# Patient Record
Sex: FEMALE | Race: WHITE | Employment: OTHER | URBAN - METROPOLITAN AREA
[De-identification: names, ages, dates, MRNs, and addresses within clinical notes are randomized per-mention and may not be internally consistent; named-entity substitution may affect disease eponyms.]

---

## 2024-04-13 ENCOUNTER — HOSPITAL ENCOUNTER (OUTPATIENT)
Age: 29
Discharge: HOME | End: 2024-04-13
Payer: COMMERCIAL

## 2024-04-13 VITALS
TEMPERATURE: 99 F | WEIGHT: 156.5 LBS | SYSTOLIC BLOOD PRESSURE: 130 MMHG | HEIGHT: 62.6 IN | DIASTOLIC BLOOD PRESSURE: 76 MMHG | BODY MASS INDEX: 28.08 KG/M2 | HEART RATE: 78 BPM | RESPIRATION RATE: 20 BRPM | OXYGEN SATURATION: 98 %

## 2024-04-13 DIAGNOSIS — M54.6 BACK PAIN OF THORACOLUMBAR REGION: Primary | ICD-10-CM

## 2024-04-13 DIAGNOSIS — M54.50 BACK PAIN OF THORACOLUMBAR REGION: Primary | ICD-10-CM

## 2024-04-13 LAB
B-HCG UR QL: NEGATIVE
BILIRUB UR QL STRIP: NEGATIVE
COLOR UR: YELLOW
GLUCOSE UR STRIP-MCNC: NEGATIVE MG/DL
HGB UR QL STRIP: NEGATIVE
LEUKOCYTE ESTERASE UR QL STRIP: NEGATIVE
NITRITE UR QL STRIP: NEGATIVE
PH UR STRIP: 6.5 [PH]
PROT UR STRIP-MCNC: NEGATIVE MG/DL
SP GR UR STRIP: 1.02
UROBILINOGEN UR STRIP-ACNC: <2 MG/DL

## 2024-04-13 PROCEDURE — 99203 OFFICE O/P NEW LOW 30 MIN: CPT

## 2024-04-13 PROCEDURE — 81025 URINE PREGNANCY TEST: CPT

## 2024-04-13 PROCEDURE — 81002 URINALYSIS NONAUTO W/O SCOPE: CPT

## 2024-04-13 PROCEDURE — 99204 OFFICE O/P NEW MOD 45 MIN: CPT

## 2024-04-13 RX ORDER — IBUPROFEN 600 MG/1
600 TABLET ORAL EVERY 8 HOURS PRN
Qty: 20 TABLET | Refills: 0 | Status: SHIPPED | OUTPATIENT
Start: 2024-04-13 | End: 2024-04-20

## 2024-04-13 RX ORDER — LIDOCAINE 50 MG/G
1 PATCH TOPICAL EVERY 24 HOURS
Qty: 10 EACH | Refills: 0 | Status: SHIPPED | OUTPATIENT
Start: 2024-04-13

## 2024-04-13 RX ORDER — CYCLOBENZAPRINE HCL 10 MG
10 TABLET ORAL NIGHTLY PRN
Qty: 10 TABLET | Refills: 0 | Status: SHIPPED | OUTPATIENT
Start: 2024-04-13 | End: 2024-04-23

## 2024-04-13 NOTE — ED INITIAL ASSESSMENT (HPI)
2 months c/o mid low back pain that is getting worse. Saw md in the past for this-blood work and UA done to check kidney status. IBU taken 5 days ago.

## 2024-04-13 NOTE — ED PROVIDER NOTES
Chief Complaint   Patient presents with    Back Pain       HPI:     Coty May is a 29 year old female without any significant medical history presents to the immediate care for evaluation mid and lower back pain that has been on and off since January.  Patient denies any fever, chills, cough, cold symptoms.  No abdominal pain, nausea, vomiting, diarrhea.,  Weakness, bladder or bowel dysfunction.  No hematuria or dysuria.  Pain worse with laying down at night.  Has tried Advil with some relief.      PFSH    PF[facility] asessment screens reviewed  Nurses notes reviewed    No family history on file.    Social History     Socioeconomic History    Marital status:      Spouse name: Not on file    Number of children: Not on file    Years of education: Not on file    Highest education level: Not on file   Occupational History    Not on file   Tobacco Use    Smoking status: Never     Passive exposure: Never    Smokeless tobacco: Never   Vaping Use    Vaping status: Every Day   Substance and Sexual Activity    Alcohol use: Not on file    Drug use: Not on file    Sexual activity: Not on file   Other Topics Concern    Not on file   Social History Narrative    Not on file     Social Determinants of Health     Financial Resource Strain: Not on file   Food Insecurity: Not on file   Transportation Needs: Not on file   Physical Activity: Not on file   Stress: Not on file   Social Connections: Not on file   Housing Stability: Not on file         ROS:   Positive for stated complaint  All other systems reviewed and negative except as noted above.  Constitutional and Vital Signs Reviewed.      Physical Exam:     Findings:    /76   Pulse 78   Temp 98.6 °F (37 °C)   Resp 20   Ht 159 cm (5' 2.6\")   Wt 71 kg   LMP 03/29/2024   SpO2 98%   BMI 28.08 kg/m²   GENERAL: alert, normal appearance, no distress.                 HEAD: Normocephalic, atraumatic.     EYES: Conjunctiva without injection, EOMs intact.                         EARS: External ears normal.                              NECK: supple.             CARDIO: Regular rate and rhythm              LUNGS: No respiratory distress, nonlabored respirations.       Clear to auscultation bilaterally without wheezes, rhonchi, or rales      MSK: Normal rom.  Generalized tenderness to palpation throughout mid and lower back both laterally and midline.  Palpable muscle spasms right mid and lower back with tenderness to palpation     NEURO: Alert and oriented.       MDM/Assessment/Plan:   Orders for this encounter:    Orders Placed This Encounter    POCT Urinalysis Dipstick    POCT Pregnancy, Urine    POCT Urine Dip    POCT Pregnancy, Urine    cyclobenzaprine 10 MG Oral Tab     Sig: Take 1 tablet (10 mg total) by mouth nightly as needed for Muscle spasms.     Dispense:  10 tablet     Refill:  0    ibuprofen 600 MG Oral Tab     Sig: Take 1 tablet (600 mg total) by mouth every 8 (eight) hours as needed for Pain or Fever.     Dispense:  20 tablet     Refill:  0    lidocaine 5 % External Patch     Sig: Place 1 patch onto the skin daily. Take off after 12 hours.  Only use 1 patch per day     Dispense:  10 each     Refill:  0       Labs performed this visit:  Recent Results (from the past 10 hour(s))   POCT Urinalysis Dipstick    Collection Time: 04/13/24 11:27 AM   Result Value Ref Range    Urine Color Yellow Yellow    Urine Clarity Slightly cloudy (A) Clear    Specific Gravity, Urine 1.025 1.005 - 1.030    PH, Urine 6.5 5.0 - 8.0    Protein urine Negative Negative mg/dL    Glucose, Urine Negative Negative mg/dL    Ketone, Urine Trace (A) Negative mg/dL    Bilirubin, Urine Negative Negative    Blood, Urine Negative Negative    Nitrite Urine Negative Negative    Urobilinogen urine <2.0 <2.0 mg/dL    Leukocyte esterase urine Negative Negative   POCT Pregnancy, Urine    Collection Time: 04/13/24 11:28 AM   Result Value Ref Range    POCT Urine Pregnancy Negative Negative       MDM:  Patient overall  well-appearing today with mid and low back pain on and off since January.  Palpable muscle spasm and tenderness to palpation on exam.  Urinalysis negative for any evidence of infection.  Give trial of treatment for musculoskeletal pain with Flexeril, ibuprofen, Lidoderm patches and have patient follow-up with primary care next week for reevaluation.  Present to ER for new or worsening symptoms.  Patient agreeable to treatment plan and follow-up, all questions answered prior to discharge    Diagnosis:    ICD-10-CM    1. Back pain of thoracolumbar region  M54.50     M54.6           All results reviewed and discussed with patient.  See AVS for detailed discharge instructions for your condition today.    Follow Up with:  PCP  Early next week and present to ER for new or worsening symptoms                  NOTE TO PATIENT:  The 21st Century Cures Act makes medical notes like these available to patients in the interest of transparency. However, be advised this is a medical document. It is intended as peer to peer communication. It is written in medical language and may contain abbreviations or verbiage that are unfamiliar. It may appear blunt or direct. Medical documents are intended to carry relevant information, facts as evident, and the clinical opinion of the practitioner.

## 2025-01-20 ENCOUNTER — HOSPITAL ENCOUNTER (EMERGENCY)
Facility: HOSPITAL | Age: 30
Discharge: HOME | End: 2025-01-20
Attending: EMERGENCY MEDICINE
Payer: COMMERCIAL

## 2025-01-20 VITALS
OXYGEN SATURATION: 97 % | WEIGHT: 167.56 LBS | TEMPERATURE: 99 F | DIASTOLIC BLOOD PRESSURE: 79 MMHG | RESPIRATION RATE: 18 BRPM | SYSTOLIC BLOOD PRESSURE: 126 MMHG | HEART RATE: 97 BPM | BODY MASS INDEX: 30 KG/M2

## 2025-01-20 DIAGNOSIS — J11.1 INFLUENZA: ICD-10-CM

## 2025-01-20 DIAGNOSIS — Z3A.14 14 WEEKS GESTATION OF PREGNANCY (HCC): Primary | ICD-10-CM

## 2025-01-20 LAB
FLUAV + FLUBV RNA SPEC NAA+PROBE: NEGATIVE
FLUAV + FLUBV RNA SPEC NAA+PROBE: POSITIVE
RSV RNA SPEC NAA+PROBE: NEGATIVE
SARS-COV-2 RNA RESP QL NAA+PROBE: NOT DETECTED

## 2025-01-20 PROCEDURE — 99283 EMERGENCY DEPT VISIT LOW MDM: CPT

## 2025-01-20 PROCEDURE — 0241U SARS-COV-2/FLU A AND B/RSV BY PCR (GENEXPERT): CPT | Performed by: EMERGENCY MEDICINE

## 2025-01-20 RX ORDER — OSELTAMIVIR PHOSPHATE 75 MG/1
75 CAPSULE ORAL 2 TIMES DAILY
Qty: 10 CAPSULE | Refills: 0 | Status: SHIPPED | OUTPATIENT
Start: 2025-01-20 | End: 2025-01-25

## 2025-01-20 NOTE — ED INITIAL ASSESSMENT (HPI)
Pt presents to ED with cough, low grade fever, runny nose from home. Pt states that she is 14 weeks pregnant. Denies N/V/D. Denies abdominal pain or cramping with bleeding.

## 2025-01-20 NOTE — ED PROVIDER NOTES
Patient Seen in: [facility] Emergency Department      History     Chief Complaint   Patient presents with    Cough/URI     15 weeks pregnant      Stated Complaint:     Subjective:   HPI      Patient is a 29-year-old woman,  14 weeks pregnant who presents with cough, congestion.  Patient's  was sick all night.  She has developed a cough and congestion as well.  No flu shot this year.  No bleeding.  Mild lower abdominal cramping.  No dysuria frequency.  No other specific complaints.  Patient is otherwise at her medical baseline.    Objective:     History reviewed. No pertinent past medical history.           History reviewed. No pertinent surgical history.             Social History     Socioeconomic History    Marital status:    Tobacco Use    Smoking status: Never     Passive exposure: Never    Smokeless tobacco: Never   Vaping Use    Vaping status: Every Day                  Physical Exam     ED Triage Vitals [25 0925]   /79   Pulse 97   Resp 18   Temp 99.1 °F (37.3 °C)   Temp src Temporal   SpO2 97 %   O2 Device None (Room air)       Current Vitals:   Vital Signs  BP: 126/79  Pulse: 97  Resp: 18  Temp: 99.1 °F (37.3 °C)  Temp src: Temporal  Fetal Heart Tones: 154    Oxygen Therapy  SpO2: 97 %  O2 Device: None (Room air)        Physical Exam  General: Patient is resting comfortably with occasional coughing  HEENT: Normal cephalic atraumatic.  Nonicteric sclera.  Moist mucous membranes.  No meningismus.  No adenopathy  Lungs: No tachypnea.  Lungs clear to auscultation bilaterally without rales/rhonchi.  Equal breath sounds bilaterally  Cardiac: No tachycardia.  No murmurs.  Regular rate and rhythm.  Abdomen: Soft and nontender throughout.  No rebound or guarding  Extremities: No clubbing/cyanosis/edema.  Skin: No rashes, no pallor  Neuro: Awake oriented ×3.  Nonfocal.  Good strength throughout    ED Course     Labs Reviewed   SARS-COV-2/FLU A AND B/RSV BY PCR (GENEXPERT) -  Abnormal; Notable for the following components:       Result Value    Influenza A by PCR Positive (*)     All other components within normal limits    Narrative:     This test is intended for the qualitative detection and differentiation of SARS-CoV-2, influenza A, influenza B, and respiratory syncytial virus (RSV) viral RNA in nasopharyngeal or nares swabs from individuals suspected of respiratory viral infection consistent with COVID-19 by their healthcare provider. Signs and symptoms of respiratory viral infection due to SARS-CoV-2, influenza, and RSV can be similar.    Test performed using the Xpert Xpress SARS-CoV-2/FLU/RSV (real time RT-PCR)  assay on the GeneXpert instrument, [employer], [place] [address].   This test is being used under the Food and Drug Administration's Emergency Use Authorization.    The authorized Fact Sheet for Healthcare Providers for this assay is available upon request from the laboratory.            Positive for influenza A  Fetal heart tones: See nurses notes 154       MDM      Patient presents with viral URI symptoms in pregnancy.  Differential clued virus, pneumonia, other.  Will check GEN expert for COVID and flu.  Offered Tylenol, patient declined.  Will check fetal heart tones.  Supportive care  Suggested Tamiflu.  Prescription was sent.  Follow-up with her OB/GYN.  Return if worsening symptoms or new complaints.      Medical Decision Making      Disposition and Plan     Clinical Impression:  1. 14 weeks gestation of pregnancy (HCC)    2. Influenza         Disposition:  Discharge  1/20/2025 10:25 am    Follow-up:  [address] [place] [id number]  [phone]    Follow up in 1 week(s)      your ob    Follow up in 1 week(s)            Medications Prescribed:  Discharge Medication List as of 1/20/2025 10:29 AM        START taking these medications    Details   oseltamivir (TAMIFLU) 75 MG Oral Cap Take 1 capsule (75 mg total) by mouth 2 (two) times daily  for 5 days., Normal, Disp-10 capsule, R-0                 Supplementary Documentation:

## 2025-01-20 NOTE — DISCHARGE INSTRUCTIONS
Tamiflu as prescribed.  Follow-up with your OB/GYN.  Follow-up with your doctor.  Return if worsening symptoms, new complaints.  Tylenol.  Stay well-hydrated.